# Patient Record
Sex: FEMALE | Employment: FULL TIME | URBAN - METROPOLITAN AREA
[De-identification: names, ages, dates, MRNs, and addresses within clinical notes are randomized per-mention and may not be internally consistent; named-entity substitution may affect disease eponyms.]

---

## 2018-02-28 ENCOUNTER — OFFICE VISIT (OUTPATIENT)
Dept: OTOLARYNGOLOGY | Facility: CLINIC | Age: 38
End: 2018-02-28

## 2018-02-28 VITALS
DIASTOLIC BLOOD PRESSURE: 67 MMHG | TEMPERATURE: 98 F | HEIGHT: 57 IN | BODY MASS INDEX: 26.54 KG/M2 | SYSTOLIC BLOOD PRESSURE: 92 MMHG | WEIGHT: 123 LBS

## 2018-02-28 DIAGNOSIS — H66.012 ACUTE SUPPURATIVE OTITIS MEDIA OF LEFT EAR WITH SPONTANEOUS RUPTURE OF TYMPANIC MEMBRANE, RECURRENCE NOT SPECIFIED: Primary | ICD-10-CM

## 2018-02-28 DIAGNOSIS — H61.21 EXCESSIVE EAR WAX, RIGHT: ICD-10-CM

## 2018-02-28 PROCEDURE — 99242 OFF/OP CONSLTJ NEW/EST SF 20: CPT | Performed by: OTOLARYNGOLOGY

## 2018-02-28 PROCEDURE — 69210 REMOVE IMPACTED EAR WAX UNI: CPT | Performed by: OTOLARYNGOLOGY

## 2018-02-28 PROCEDURE — 99212 OFFICE O/P EST SF 10 MIN: CPT | Performed by: OTOLARYNGOLOGY

## 2018-02-28 RX ORDER — MULTIVITAMIN
1 TABLET ORAL DAILY
COMMUNITY

## 2018-02-28 NOTE — PROGRESS NOTES
Urvashi Mayo is a 40year old female.   Patient presents with:  Ear Problem: yellow discharge from right ear since Monday   Nose Problem: clear discharge from nose since Monday       HISTORY OF PRESENT ILLNESS  2/28/2018  Patient presents with her father an Integumentary Negative Frequent skin infections, pigment change and rash. Hema/Lymph Negative Easy bleeding and easy bruising. PHYSICAL EXAM    BP 92/67 (BP Location: Right arm, Patient Position: Sitting, Cuff Size: adult)   Temp 98.3 °F (36. Neomycin-Polymyxin-HC (CORTISPORIN OT), Place 4 drops in ear(s) 4 (four) times daily. , Disp: , Rfl:   •  Multiple Vitamin (MULTI-VITAMIN DAILY) Oral Tab, Take 1 tablet by mouth daily. , Disp: , Rfl:   ASSESSMENT AND PLAN    1.  Acute suppurative otitis media